# Patient Record
Sex: MALE | Race: WHITE
[De-identification: names, ages, dates, MRNs, and addresses within clinical notes are randomized per-mention and may not be internally consistent; named-entity substitution may affect disease eponyms.]

---

## 2017-08-04 NOTE — PD.RAD
Post CT Procedure Prog Note


Pre Procedure Diagnosis:  


(1) Acquired thrombocytopenia


Post Procedure Diagnosis:  


(1) Acquired thrombocytopenia


Procedure Date:


Aug 4, 2017


Supervising Radiologist:


Greg Wheat


Estimated blood loss:


5cc


Anesthesia:  Conscious Sedation


Plan of Activity


Patient to Unit:  ROPU


Patient Condition:  Good


See PACS Report for procedural detail/treatment





Biopsy


Imaging Guidance:  CT


Side:  Left


Biopsy Procedure:  Bone Marrow


Specimen:  Core Biopsy


Plan


to ROPU then discharge after 2 hours.








Greg Wheat MD Aug 4, 2017 11:22

## 2018-04-04 ENCOUNTER — HOSPITAL ENCOUNTER (EMERGENCY)
Dept: HOSPITAL 17 - NEPC | Age: 73
LOS: 1 days | Discharge: HOME | End: 2018-04-05
Payer: COMMERCIAL

## 2018-04-04 VITALS
HEART RATE: 100 BPM | TEMPERATURE: 100.1 F | SYSTOLIC BLOOD PRESSURE: 120 MMHG | RESPIRATION RATE: 16 BRPM | DIASTOLIC BLOOD PRESSURE: 67 MMHG | OXYGEN SATURATION: 97 %

## 2018-04-04 VITALS — BODY MASS INDEX: 25.96 KG/M2 | WEIGHT: 175.27 LBS | HEIGHT: 69 IN

## 2018-04-04 DIAGNOSIS — R50.9: Primary | ICD-10-CM

## 2018-04-04 DIAGNOSIS — I10: ICD-10-CM

## 2018-04-04 PROCEDURE — 99285 EMERGENCY DEPT VISIT HI MDM: CPT

## 2018-04-04 PROCEDURE — 85025 COMPLETE CBC W/AUTO DIFF WBC: CPT

## 2018-04-04 PROCEDURE — 71250 CT THORAX DX C-: CPT

## 2018-04-04 PROCEDURE — 80053 COMPREHEN METABOLIC PANEL: CPT

## 2018-04-04 PROCEDURE — 87040 BLOOD CULTURE FOR BACTERIA: CPT

## 2018-04-04 PROCEDURE — 83605 ASSAY OF LACTIC ACID: CPT

## 2018-04-04 PROCEDURE — 71045 X-RAY EXAM CHEST 1 VIEW: CPT

## 2018-04-04 PROCEDURE — 87804 INFLUENZA ASSAY W/OPTIC: CPT

## 2018-04-04 PROCEDURE — 81001 URINALYSIS AUTO W/SCOPE: CPT

## 2018-04-05 VITALS
SYSTOLIC BLOOD PRESSURE: 116 MMHG | OXYGEN SATURATION: 98 % | HEART RATE: 70 BPM | RESPIRATION RATE: 16 BRPM | DIASTOLIC BLOOD PRESSURE: 66 MMHG

## 2018-04-05 VITALS
OXYGEN SATURATION: 95 % | RESPIRATION RATE: 16 BRPM | SYSTOLIC BLOOD PRESSURE: 119 MMHG | DIASTOLIC BLOOD PRESSURE: 62 MMHG | HEART RATE: 77 BPM

## 2018-04-05 VITALS — OXYGEN SATURATION: 96 %

## 2018-04-05 LAB
ALBUMIN SERPL-MCNC: 4 GM/DL (ref 3.4–5)
ALP SERPL-CCNC: 53 U/L (ref 45–117)
ALT SERPL-CCNC: 35 U/L (ref 12–78)
AST SERPL-CCNC: 23 U/L (ref 15–37)
BACTERIA #/AREA URNS HPF: (no result) /HPF
BASOPHILS # BLD AUTO: 0 TH/MM3 (ref 0–0.2)
BASOPHILS NFR BLD: 0 % (ref 0–2)
BILIRUB SERPL-MCNC: 1.1 MG/DL (ref 0.2–1)
BUN SERPL-MCNC: 25 MG/DL (ref 7–18)
CALCIUM SERPL-MCNC: 8.7 MG/DL (ref 8.5–10.1)
CHLORIDE SERPL-SCNC: 109 MEQ/L (ref 98–107)
COLOR UR: YELLOW
CREAT SERPL-MCNC: 1.5 MG/DL (ref 0.6–1.3)
EOSINOPHIL # BLD: 0.2 TH/MM3 (ref 0–0.4)
EOSINOPHIL NFR BLD: 2.7 % (ref 0–4)
ERYTHROCYTE [DISTWIDTH] IN BLOOD BY AUTOMATED COUNT: 21.9 % (ref 11.6–17.2)
GFR SERPLBLD BASED ON 1.73 SQ M-ARVRAT: 46 ML/MIN (ref 89–?)
GLUCOSE SERPL-MCNC: 112 MG/DL (ref 74–106)
GLUCOSE UR STRIP-MCNC: (no result) MG/DL
HCO3 BLD-SCNC: 24.3 MEQ/L (ref 21–32)
HCT VFR BLD CALC: 38.3 % (ref 39–51)
HGB BLD-MCNC: 12.9 GM/DL (ref 13–17)
HGB UR QL STRIP: (no result)
HYALINE CASTS #/AREA URNS LPF: 7 /LPF
KETONES UR STRIP-MCNC: (no result) MG/DL
LYMPHOCYTES # BLD AUTO: 0.6 TH/MM3 (ref 1–4.8)
LYMPHOCYTES NFR BLD AUTO: 6.8 % (ref 9–44)
MCH RBC QN AUTO: 32.4 PG (ref 27–34)
MCHC RBC AUTO-ENTMCNC: 33.8 % (ref 32–36)
MCV RBC AUTO: 96.1 FL (ref 80–100)
MONOCYTE #: 0.2 TH/MM3 (ref 0–0.9)
MONOCYTES NFR BLD: 2.3 % (ref 0–8)
MUCOUS THREADS #/AREA URNS LPF: (no result) /LPF
NEUTROPHILS # BLD AUTO: 7.2 TH/MM3 (ref 1.8–7.7)
NEUTROPHILS NFR BLD AUTO: 88.2 % (ref 16–70)
NITRITE UR QL STRIP: (no result)
PLATELET # BLD: 471 TH/MM3 (ref 150–450)
PMV BLD AUTO: 9.2 FL (ref 7–11)
PROT SERPL-MCNC: 7.2 GM/DL (ref 6.4–8.2)
RBC # BLD AUTO: 3.99 MIL/MM3 (ref 4.5–5.9)
SODIUM SERPL-SCNC: 142 MEQ/L (ref 136–145)
SP GR UR STRIP: 1.02 (ref 1–1.03)
SQUAMOUS #/AREA URNS HPF: 7 /HPF (ref 0–5)
URINE LEUKOCYTE ESTERASE: (no result)
WBC # BLD AUTO: 8.2 TH/MM3 (ref 4–11)

## 2018-04-05 NOTE — RADRPT
EXAM DATE/TIME:  04/04/2018 23:54 

 

[facility] COMPARISON:     

CHEST SINGLE AP, November 09, 2017, 7:13.  CT THORAX W/O CONTRAST, November 06, 2017, 22:12.

 

                     

INDICATIONS :     

Fever. Chills. 

                     

 

MEDICAL HISTORY :     

Hypertension.  Leukemia.        

 

SURGICAL HISTORY :        

Hernia repair. 

 

ENCOUNTER:     

Initial                                        

 

ACUITY:     

1 day      

 

PAIN SCORE:     

0/10

 

LOCATION:     

Bilateral chest 

 

FINDINGS:     

A single view of the chest demonstrates the lungs to be symmetrically aerated without evidence of mas
s, infiltrate or effusion.  The cardiomediastinal contours are unremarkable.  Osseous structures are 
intact.

 

CONCLUSION:     No acute disease.  

 

 

 

 Greg Zamora MD on April 05, 2018 at 0:20           

Board Certified Radiologist.

 This report was verified electronically.

## 2018-04-05 NOTE — RADRPT
EXAM DATE/TIME:  04/05/2018 02:11 

 

[facility] COMPARISON:     

CT THORAX W/O CONTRAST, November 06, 2017, 22:12.

 

 

INDICATIONS :     

Fever, evaluate for pneumonia. 

                      

 

RADIATION DOSE:     

8.14 CTDIvol (mGy) 

 

 

MEDICAL HISTORY :     

Leukemia. Hypertension.  

 

SURGICAL HISTORY :      

None.  

 

ENCOUNTER:      

Initial

 

ACUITY:      

1 day

 

PAIN SCALE:      

0/10

 

LOCATION:        

chest 

 

TECHNIQUE:      

Volumetric scanning of the chest was performed.  Using automated exposure control and adjustment of t
he mA and/or kV according to patient size, radiation dose was kept as low as reasonably achievable to
 obtain optimal diagnostic quality images.  DICOM format image data is available electronically for r
eview and comparison.  

 

Follow-up recommendations for detected pulmonary nodules are based at a minimum on nodule size and pa
tient risk factors according to Fleischner Society Guidelines.

 

FINDINGS:     

 

LUNGS:     

Streaky parenchymal opacities in the lung bases, mainly posterior which are roughly stable. No eviden
ce of lobar consolidation. Granulomas calcifications in the right infrahilar region. Stable tiny nonc
alcified nodular density in the anterior right middle lobe and smaller lesions in the anterolateral r
ight upper lobe, also stable.

 

PLEURAE:     

There is no pleural thickening or pleural effusion.

 

MEDIASTINUM:     

The heart and great vessels demonstrate no acute abnormality.  There is no mediastinal or hilar lymph
adenopathy. Dense coronary calcifications

 

AXILLAE:     

Within normal limits.  No lymphadenopathy.

 

MUSCULOSKELETAL:     

Within normal limits for patient age.

 

MISCELLANEOUS:     

Multiple hepatic cysts again noted. Densely calcified lesion in the upper aspect of the spleen. Splen
omegaly.

 

CONCLUSION:     

Patchy basilar parenchymal opacities which appear roughly stable.

Stable right lung nodules.

 

 

 

 

 

 Greg Zamora MD on April 05, 2018 at 2:20           

Board Certified Radiologist.

 This report was verified electronically.

## 2018-04-05 NOTE — PD
HPI


Chief Complaint:  Fever


Time Seen by Provider:  23:48


Travel History


International Travel<30 days:  No


Contact w/Intl Traveler<30days:  No


Traveled to known affect area:  No





History of Present Illness


HPI


Patient is a 73-year-old male with myeloproliferative leukemia he is being 

followed by Dr. Lane.  Tonight he had a fever of 101.7 at home.  They called 

the oncology group and Dr. Baker told him to come to the ER.  Patient 

recently gave blood but he says he feels a little pain in the venipuncture site 

but he does not think he has got a cellulitis.  He denies shortness of breath 

he denies cough he denies dysuria denies shortness of breath.  He reports last 

time he had a fever he had a pneumonia seen on a CT chest tonight will order 

urine chest x-ray labs and I will get in touch with his oncologist group Dr. Baker on-call patient denies feeling weak however they took his temperature 

tonight because he was having shaking chills that were associated with the 

fever and she found him to have a fever after had shaking chills.  He did not 

take any Motrin or Tylenol because he was told to come to the ER so we can 

measure his temp without any antipyretics.  It has been 1 day of fever because 

the chills started tonight





PFSH


Past Medical History


Cancer:  Yes (LEUKEMIA)


Cardiovascular Problems:  Yes (HTN)


Diabetes:  No


Endocrine:  No


Genitourinary:  No


Hepatitis:  No


Hiatal Hernia:  No


Hypertension:  Yes


Immune Disorder:  No


Musculoskeletal:  No


Neurologic:  No


Psychiatric:  No


Reproductive:  No


Respiratory:  No


Immunizations Current:  Yes


Pneumonia:  Yes


Thyroid Disease:  No





Past Surgical History


Abdominal Surgery:  Yes (3 HERNIA REPAIR)


AICD:  No


Cardiac Surgery:  No


Ear Surgery:  No


Eye Surgery:  Yes (cataract surgey 2014)


Joint Replacement:  No


Oral Surgery:  No


Pacemaker:  No


Thoracic Surgery:  No


Other Surgery:  Yes





Social History


Alcohol Use:  Yes (2 XS WEEKLY)


Tobacco Use:  No


Substance Use:  No





Allergies-Medications


(Allergen,Severity, Reaction):  


Coded Allergies:  


     No Known Allergies (Unverified  Adverse Reaction, Unknown, 11/6/17)


Reported Meds & Prescriptions





Reported Meds & Active Scripts


Active


Cipro (Ciprofloxacin HCl) 500 Mg Tab 500 Mg PO BID


Reported


[Fish Oil]   1,200  


Acyclovir 800 Mg Tab 800 Mg PO BID


Hydrea (Hydroxyurea) 500 Mg Cap 500 Mg PO DAILY


Aspirin Low Dose (Aspirin) 81 Mg Chew 81 Mg CHEW DAILY


Simvastatin 20 Mg Tab 20 Mg PO DAILY


Lisinopril 20 Mg Tab 20 Mg PO DAILY








Review of Systems


Except as stated in HPI:  all other systems reviewed are Neg


General / Constitutional:  Positive: Fever, Chills





Physical Exam


Narrative


GENERAL: pt non toxic  appearing


SKIN: Warm and dry.


HEAD: Atraumatic. Normocephalic. 


EYES: Pupils equal and round. No scleral icterus. No injection or drainage. 


ENT: No nasal bleeding or discharge.  Mucous membranes pink and moist.


NECK: Trachea midline. No JVD. 


CARDIOVASCULAR: Regular rate and rhythm.  


RESPIRATORY: No accessory muscle use. Clear to auscultation. Breath sounds 

equal bilaterally. 


GASTROINTESTINAL: Abdomen soft, non-tender, nondistended. Hepatic and splenic 

margins not palpable. 


MUSCULOSKELETAL: Extremities without clubbing, cyanosis, or edema. No obvious 

deformities. 


NEUROLOGICAL: Awake and alert. No obvious cranial nerve deficits.  Motor 

grossly within normal limits. Five out of 5 muscle strength in the arms and 

legs.  Normal speech.


PSYCHIATRIC: Appropriate mood and affect; insight and judgment normal.





Data


Data


Last Documented VS





Orders





 Orders


Complete Blood Count With Diff (4/4/18 23:41)


Comprehensive Metabolic Panel (4/4/18 23:41)


Urinalysis - C+S If Indicated (4/4/18 23:41)


Blood Culture (4/4/18 23:41)


Iv Access Insert/Monitor (4/4/18 23:41)


Chest, Single Ap (4/4/18 23:41)


Ecg Monitoring (4/4/18 23:41)


Oximetry (4/4/18 23:41)


Lactic Acid (4/5/18 00:14)


Influenzae A/B Antigen (4/5/18 01:05)


Sodium Chlorid 0.9% 500 Ml Inj (Ns 500 M (4/5/18 01:15)


Ct Thorax/ Chest Wo Iv Contras (4/5/18 )


Ciprofloxacin (Cipro) (4/5/18 04:00)


Ed Discharge Order (4/5/18 04:16)





Labs





Laboratory Tests








Test


  4/5/18


00:14 4/5/18


01:27


 


White Blood Count 8.2 TH/MM3  


 


Red Blood Count 3.99 MIL/MM3  


 


Hemoglobin 12.9 GM/DL  


 


Hematocrit 38.3 %  


 


Mean Corpuscular Volume 96.1 FL  


 


Mean Corpuscular Hemoglobin 32.4 PG  


 


Mean Corpuscular Hemoglobin


Concent 33.8 % 


  


 


 


Red Cell Distribution Width 21.9 %  


 


Platelet Count 471 TH/MM3  


 


Mean Platelet Volume 9.2 FL  


 


Neutrophils (%) (Auto) 88.2 %  


 


Lymphocytes (%) (Auto) 6.8 %  


 


Monocytes (%) (Auto) 2.3 %  


 


Eosinophils (%) (Auto) 2.7 %  


 


Basophils (%) (Auto) 0.0 %  


 


Neutrophils # (Auto) 7.2 TH/MM3  


 


Lymphocytes # (Auto) 0.6 TH/MM3  


 


Monocytes # (Auto) 0.2 TH/MM3  


 


Eosinophils # (Auto) 0.2 TH/MM3  


 


Basophils # (Auto) 0.0 TH/MM3  


 


CBC Comment DIFF FINAL  


 


Differential Comment   


 


Urine Color YELLOW  


 


Urine Turbidity CLOUDY  


 


Urine pH 6.0  


 


Urine Specific Gravity 1.020  


 


Urine Protein 100 mg/dL  


 


Urine Glucose (UA) NEG mg/dL  


 


Urine Ketones NEG mg/dL  


 


Urine Occult Blood TRACE  


 


Urine Nitrite NEG  


 


Urine Bilirubin NEG  


 


Urine Urobilinogen


  LESS THAN 2.0


MG/DL 


 


 


Urine Leukocyte Esterase NEG  


 


Urine RBC 4 /hpf  


 


Urine WBC 3 /hpf  


 


Urine Squamous Epithelial


Cells 7 /hpf 


  


 


 


Urine Bacteria OCC /hpf  


 


Urine Hyaline Casts 7 /lpf  


 


Urine Mucus FEW /lpf  


 


Microscopic Urinalysis Comment


  CULT NOT


INDICATED 


 


 


Blood Urea Nitrogen 25 MG/DL  


 


Creatinine 1.50 MG/DL  


 


Random Glucose 112 MG/DL  


 


Total Protein 7.2 GM/DL  


 


Albumin 4.0 GM/DL  


 


Calcium Level 8.7 MG/DL  


 


Alkaline Phosphatase 53 U/L  


 


Aspartate Amino Transf


(AST/SGOT) 23 U/L 


  


 


 


Alanine Aminotransferase


(ALT/SGPT) 35 U/L 


  


 


 


Total Bilirubin 1.1 MG/DL  


 


Sodium Level 142 MEQ/L  


 


Potassium Level 3.7 MEQ/L  


 


Chloride Level 109 MEQ/L  


 


Carbon Dioxide Level 24.3 MEQ/L  


 


Anion Gap 9 MEQ/L  


 


Estimat Glomerular Filtration


Rate 46 ML/MIN 


  


 


 


Lactic Acid Level  0.9 mmol/L 











[facility]


Medical Decision Making


Medical Screen Exam Complete:  Yes


Emergency Medical Condition:  Yes


Differential Diagnosis


uti  vs  PNA  vs   FUO  ,vs  viral  syndrome  other


Narrative Course


UA  neg  and  CT  chest unchanged  ,  stable opacification and  nodules 

unchanges  WBC  8.2     pt feels well enough to go home  PO cipro and  follow 

up as outpt





Physician Communication


Physician Communication


Dr baker  [facility]  called and we discussed  d/c home  with  PO cipro and  close

  follow up  with  Dr Salas





Diagnosis





 Primary Impression:  


 Fever


 Qualified Codes:  R50.9 - Fever, unspecified


Referrals:  


Maninder Sin MD


Patient Instructions:  Fever in Adults (ED), General Instructions





***Additional Instructions:  


Please call Dr Maninder Sin  office in AM  and take  Cipro antibiotic until  

advised otherwise by Dr Sin


Scripts


Ciprofloxacin (Cipro) 500 Mg Tab


500 MG PO BID for Infection, #14 TAB 0 Refills


   Prov: Derek Whitlock MD         4/5/18


Disposition:  01 DISCHARGE HOME


Condition:  Good











Derek Whitlock MD Apr 5, 2018 01:13

## 2025-02-26 NOTE — RADRPT
EXAM DATE/TIME:  08/04/2017 10:56 

 

[facility] COMPARISON:     

No previous studies available for comparison.

 

 

INDICATIONS :      

Thrombocytopenia. 

       

 

SEDATION TIME:       

15 minutes

 

 

BIOPSY SITE:       

Left iliac

                     

 

MEDICATION(S):

1.) 4 mg midazolam (Versed) IV  

2.) 200 mcg fentanyl (Sublimaze) IV  

 

 

DEVICE(S):

1.) 11 gauge Bone marrow biopsy needle 

                     

 

MEDICAL HISTORY :     

Hypertension.   Kidney disease

 

SURGICAL HISTORY :     

Appendectomy.   

 

ENCOUNTER:     

Initial

 

ACUITY:     

1 day

 

PAIN SCORE:     

0/10

 

LOCATION:     

Left pelvis

                     

A total of one core specimen(s) were obtained and sent to the laboratory for pathologic evaluation.

 

 

PROCEDURE:

1.   CT guided bone marrow biopsy.

2.   Conscious sedation with continuous EKG and oximetry monitoring.

 

 

Prior to the procedure informed consent was obtained.  Any appropriate prior imaging studies were rev
iewed.  Using automated exposure control and adjustment of the mA and/or kV according to patient size
, radiation dose was kept as low as reasonably achievable to obtain optimal diagnostic quality images
.  DICOM format image data is available electronically for review and comparison.  

 

The site was prepped in a sterile fashion.  Full sterile technique was used, including cap, mask, mario
rile gloves and gown and a large sterile sheet.  Hand hygiene and 2% chlorhexidine and/or betadine/al
cohol prep was utilized per protocol for cutaneous antisepsis.  The skin and subcutaneous tissues wer
e infiltrated with local anesthetic solution.

 

With CT guidance the previously identified target was localized. Biopsy was performed using the presc
ribed needle as above.  Following biopsy marrow aspiration was performed with repeat puncture. Adequa
te hemostasis was obtained with compression at the puncture site.

 

Conscious sedation was performed with the prescribed dosages and duration as above in the presence of
 an independent trained radiology nurse to assist in the monitoring of the patient.  EKG and oximetry
 remained stable throughout the procedure. The patient tolerated the procedure well and there were no
 complications.  The patient was sent to Radiology Outpatient Unit in stable condition.

 

CONCLUSION:     

1.  Uncomplicated CT guided bone marrow aspirate.

2.  Uncomplicated CT guided bone marrow biopsy.

 

 

 

 Greg Wheat MD on August 04, 2017 at 16:19           

Board Certified Radiologist.

 This report was verified electronically.
In my phone